# Patient Record
Sex: FEMALE | Race: WHITE | NOT HISPANIC OR LATINO | Employment: FULL TIME | URBAN - METROPOLITAN AREA
[De-identification: names, ages, dates, MRNs, and addresses within clinical notes are randomized per-mention and may not be internally consistent; named-entity substitution may affect disease eponyms.]

---

## 2023-04-10 ENCOUNTER — HOSPITAL ENCOUNTER (OUTPATIENT)
Dept: DATA CONVERSION | Facility: HOSPITAL | Age: 63
End: 2023-04-10
Attending: SPECIALIST | Admitting: SPECIALIST
Payer: COMMERCIAL

## 2023-04-10 DIAGNOSIS — S82.009A UNSPECIFIED FRACTURE OF UNSPECIFIED PATELLA, INITIAL ENCOUNTER FOR CLOSED FRACTURE: ICD-10-CM

## 2023-04-10 DIAGNOSIS — F32.A DEPRESSION, UNSPECIFIED: ICD-10-CM

## 2023-04-10 DIAGNOSIS — Z72.0 TOBACCO USE: ICD-10-CM

## 2023-04-10 DIAGNOSIS — S82.031A DISPLACED TRANSVERSE FRACTURE OF RIGHT PATELLA, INITIAL ENCOUNTER FOR CLOSED FRACTURE: ICD-10-CM

## 2023-04-10 DIAGNOSIS — E78.5 HYPERLIPIDEMIA, UNSPECIFIED: ICD-10-CM

## 2023-04-10 DIAGNOSIS — F41.9 ANXIETY DISORDER, UNSPECIFIED: ICD-10-CM

## 2023-04-10 LAB
ANION GAP IN SER/PLAS: 11 MMOL/L (ref 10–20)
ATRIAL RATE: 90 BPM
CALCIUM (MG/DL) IN SER/PLAS: 8.6 MG/DL (ref 8.6–10.3)
CARBON DIOXIDE, TOTAL (MMOL/L) IN SER/PLAS: 29 MMOL/L (ref 21–32)
CHLORIDE (MMOL/L) IN SER/PLAS: 104 MMOL/L (ref 98–107)
CREATININE (MG/DL) IN SER/PLAS: 0.52 MG/DL (ref 0.5–1.05)
ERYTHROCYTE DISTRIBUTION WIDTH (RATIO) BY AUTOMATED COUNT: 14 % (ref 11.5–14.5)
ERYTHROCYTE MEAN CORPUSCULAR HEMOGLOBIN CONCENTRATION (G/DL) BY AUTOMATED: 32.6 G/DL (ref 32–36)
ERYTHROCYTE MEAN CORPUSCULAR VOLUME (FL) BY AUTOMATED COUNT: 93 FL (ref 80–100)
ERYTHROCYTES (10*6/UL) IN BLOOD BY AUTOMATED COUNT: 3.88 X10E12/L (ref 4–5.2)
GFR FEMALE: >90 ML/MIN/1.73M2
GLUCOSE (MG/DL) IN SER/PLAS: 98 MG/DL (ref 74–99)
HEMATOCRIT (%) IN BLOOD BY AUTOMATED COUNT: 35.9 % (ref 36–46)
HEMOGLOBIN (G/DL) IN BLOOD: 11.7 G/DL (ref 12–16)
LEUKOCYTES (10*3/UL) IN BLOOD BY AUTOMATED COUNT: 9.3 X10E9/L (ref 4.4–11.3)
P AXIS: 15 DEGREES
PLATELETS (10*3/UL) IN BLOOD AUTOMATED COUNT: 313 X10E9/L (ref 150–450)
POTASSIUM (MMOL/L) IN SER/PLAS: 3.7 MMOL/L (ref 3.5–5.3)
PR INTERVAL: 140 MS
Q ONSET: 251 MS
QRS COUNT: 15 BEATS
QRS DURATION: 88 MS
QT INTERVAL: 408 MS
QTC CALCULATION(BAZETT): 500 MS
QTC FREDERICIA: 467 MS
R AXIS: -17 DEGREES
SODIUM (MMOL/L) IN SER/PLAS: 140 MMOL/L (ref 136–145)
T AXIS: 109 DEGREES
T OFFSET: 455 MS
UREA NITROGEN (MG/DL) IN SER/PLAS: 9 MG/DL (ref 6–23)
VENTRICULAR RATE: 90 BPM

## 2023-04-18 ENCOUNTER — HOSPITAL ENCOUNTER (OUTPATIENT)
Dept: DATA CONVERSION | Facility: HOSPITAL | Age: 63
End: 2023-04-18
Attending: ORTHOPAEDIC SURGERY | Admitting: ORTHOPAEDIC SURGERY
Payer: COMMERCIAL

## 2023-04-18 DIAGNOSIS — F32.A DEPRESSION, UNSPECIFIED: ICD-10-CM

## 2023-04-18 DIAGNOSIS — F41.9 ANXIETY DISORDER, UNSPECIFIED: ICD-10-CM

## 2023-04-18 DIAGNOSIS — F17.200 NICOTINE DEPENDENCE, UNSPECIFIED, UNCOMPLICATED: ICD-10-CM

## 2023-04-18 DIAGNOSIS — M26.609 UNSPECIFIED TEMPOROMANDIBULAR JOINT DISORDER, UNSPECIFIED SIDE: ICD-10-CM

## 2023-04-18 DIAGNOSIS — R32 UNSPECIFIED URINARY INCONTINENCE: ICD-10-CM

## 2023-04-18 DIAGNOSIS — R49.9 UNSPECIFIED VOICE AND RESONANCE DISORDER: ICD-10-CM

## 2023-04-18 DIAGNOSIS — E78.5 HYPERLIPIDEMIA, UNSPECIFIED: ICD-10-CM

## 2023-04-18 DIAGNOSIS — M54.50 LOW BACK PAIN, UNSPECIFIED: ICD-10-CM

## 2023-04-18 DIAGNOSIS — G89.29 OTHER CHRONIC PAIN: ICD-10-CM

## 2023-04-18 DIAGNOSIS — S52.572A OTHER INTRAARTICULAR FRACTURE OF LOWER END OF LEFT RADIUS, INITIAL ENCOUNTER FOR CLOSED FRACTURE: ICD-10-CM

## 2023-04-18 LAB
ATRIAL RATE: 93 BPM
P AXIS: 25 DEGREES
PR INTERVAL: 136 MS
Q ONSET: 249 MS
QRS COUNT: 14 BEATS
QRS DURATION: 78 MS
QT INTERVAL: 427 MS
QTC CALCULATION(BAZETT): 532 MS
QTC FREDERICIA: 494 MS
R AXIS: -41 DEGREES
T AXIS: 235 DEGREES
T OFFSET: 463 MS
VENTRICULAR RATE: 93 BPM

## 2023-05-15 ENCOUNTER — OFFICE VISIT (OUTPATIENT)
Dept: PRIMARY CARE | Facility: CLINIC | Age: 63
End: 2023-05-15
Payer: COMMERCIAL

## 2023-05-15 VITALS
HEART RATE: 102 BPM | SYSTOLIC BLOOD PRESSURE: 128 MMHG | DIASTOLIC BLOOD PRESSURE: 70 MMHG | BODY MASS INDEX: 20.3 KG/M2 | OXYGEN SATURATION: 98 % | WEIGHT: 122 LBS | TEMPERATURE: 97.3 F

## 2023-05-15 DIAGNOSIS — D64.9 ANEMIA, UNSPECIFIED TYPE: ICD-10-CM

## 2023-05-15 DIAGNOSIS — F41.1 GENERALIZED ANXIETY DISORDER: Chronic | ICD-10-CM

## 2023-05-15 DIAGNOSIS — Z13.220 SCREENING FOR HYPERLIPIDEMIA: ICD-10-CM

## 2023-05-15 DIAGNOSIS — Z13.1 SCREENING FOR DIABETES MELLITUS: ICD-10-CM

## 2023-05-15 DIAGNOSIS — F33.1 MODERATE RECURRENT MAJOR DEPRESSION (MULTI): Primary | Chronic | ICD-10-CM

## 2023-05-15 PROBLEM — M26.629 TMJ SYNDROME: Status: ACTIVE | Noted: 2023-05-15

## 2023-05-15 PROBLEM — F10.90 ALCOHOL USE: Chronic | Status: ACTIVE | Noted: 2023-05-15

## 2023-05-15 PROBLEM — Z78.9 ALCOHOL USE: Status: ACTIVE | Noted: 2023-05-15

## 2023-05-15 PROBLEM — Z78.9 ALCOHOL USE: Chronic | Status: ACTIVE | Noted: 2023-05-15

## 2023-05-15 PROBLEM — F51.04 PSYCHOPHYSIOLOGIC INSOMNIA: Status: ACTIVE | Noted: 2022-01-25

## 2023-05-15 PROBLEM — F10.90 ALCOHOL USE: Status: ACTIVE | Noted: 2023-05-15

## 2023-05-15 PROCEDURE — 1036F TOBACCO NON-USER: CPT | Performed by: FAMILY MEDICINE

## 2023-05-15 PROCEDURE — 99214 OFFICE O/P EST MOD 30 MIN: CPT | Performed by: FAMILY MEDICINE

## 2023-05-15 RX ORDER — FLUOXETINE HYDROCHLORIDE 20 MG/1
20 CAPSULE ORAL DAILY
COMMUNITY
End: 2023-05-15

## 2023-05-15 RX ORDER — FLUOXETINE HYDROCHLORIDE 40 MG/1
40 CAPSULE ORAL DAILY
Qty: 90 CAPSULE | Refills: 1 | Status: SHIPPED | OUTPATIENT
Start: 2023-05-15 | End: 2023-08-23 | Stop reason: SDUPTHER

## 2023-05-15 ASSESSMENT — ENCOUNTER SYMPTOMS
FEVER: 0
COUGH: 0
CHILLS: 0

## 2023-05-15 NOTE — PROGRESS NOTES
Subjective   Patient ID: Kate Churchill is a 62 y.o. female who presents for Anxiety, Depression, and Insomnia.    Kate presents for follow-up.  She recently had a Worker's Comp. injury and then also has broken her wrist.  Her mood has been down with the repeat injuries.  Has been taking her fluoxetine 20 mg daily, but does not feel like it is helping.  She is also trying to move.         Review of Systems   Constitutional:  Negative for chills and fever.   HENT:  Negative for congestion.    Respiratory:  Negative for cough.        Objective   /70   Pulse 102   Temp 36.3 °C (97.3 °F)   Wt 55.3 kg (122 lb)   SpO2 98%   BMI 20.30 kg/m²     Physical Exam  Constitutional:       General: She is not in acute distress.     Appearance: Normal appearance.   HENT:      Head: Normocephalic.      Mouth/Throat:      Mouth: Mucous membranes are moist.   Eyes:      Extraocular Movements: Extraocular movements intact.      Conjunctiva/sclera: Conjunctivae normal.   Cardiovascular:      Rate and Rhythm: Normal rate and regular rhythm.      Heart sounds: No murmur heard.  Pulmonary:      Breath sounds: No wheezing or rhonchi.   Musculoskeletal:      Cervical back: Neck supple.   Skin:     General: Skin is warm and dry.   Neurological:      Mental Status: She is alert.   Psychiatric:         Behavior: Behavior normal.      Comments: Tearful         Assessment/Plan   Problem List Items Addressed This Visit          Hematologic    Anemia    Relevant Orders    CBC and Auto Differential    Folate       Other    Generalized anxiety disorder (Chronic)    Relevant Medications    FLUoxetine (PROzac) 40 mg capsule    Moderate recurrent major depression (CMS/HCC) - Primary (Chronic)     Increase fluoxetine to 40mg daily. Follow-up in 3 months.          Relevant Medications    FLUoxetine (PROzac) 40 mg capsule     Other Visit Diagnoses       Screening for diabetes mellitus        Relevant Orders    Comprehensive Metabolic  Panel    Screening for hyperlipidemia        Relevant Orders    Lipid Panel

## 2023-07-12 ENCOUNTER — TELEPHONE (OUTPATIENT)
Dept: PRIMARY CARE | Facility: CLINIC | Age: 63
End: 2023-07-12
Payer: COMMERCIAL

## 2023-07-12 NOTE — TELEPHONE ENCOUNTER
Called and spoke with Chelsey- informed of provider appenage. She stated she would get patient to the ED. TAI

## 2023-07-12 NOTE — TELEPHONE ENCOUNTER
Pt's sister called Rx line @ 232 stating she wanted a call back regarding pt.     Called pt's sister to get more information, she stated that pt has finally agreed to go get treatment but they are unsure of where to go or how to go about this. Pt has been drinking a lot and is having suicidal ideation. Is this something you can help pt with? Please advise, AM

## 2023-08-14 ENCOUNTER — APPOINTMENT (OUTPATIENT)
Dept: PRIMARY CARE | Facility: CLINIC | Age: 63
End: 2023-08-14

## 2023-08-19 ENCOUNTER — LAB (OUTPATIENT)
Dept: LAB | Facility: LAB | Age: 63
End: 2023-08-19
Payer: COMMERCIAL

## 2023-08-19 DIAGNOSIS — D64.9 ANEMIA, UNSPECIFIED TYPE: ICD-10-CM

## 2023-08-19 DIAGNOSIS — Z13.220 SCREENING FOR HYPERLIPIDEMIA: ICD-10-CM

## 2023-08-19 DIAGNOSIS — Z13.1 SCREENING FOR DIABETES MELLITUS: ICD-10-CM

## 2023-08-19 LAB
ALANINE AMINOTRANSFERASE (SGPT) (U/L) IN SER/PLAS: 20 U/L (ref 7–45)
ALBUMIN (G/DL) IN SER/PLAS: 4.1 G/DL (ref 3.4–5)
ALKALINE PHOSPHATASE (U/L) IN SER/PLAS: 79 U/L (ref 33–136)
ANION GAP IN SER/PLAS: 9 MMOL/L (ref 10–20)
ASPARTATE AMINOTRANSFERASE (SGOT) (U/L) IN SER/PLAS: 16 U/L (ref 9–39)
BASOPHILS (10*3/UL) IN BLOOD BY AUTOMATED COUNT: 0.1 X10E9/L (ref 0–0.1)
BASOPHILS/100 LEUKOCYTES IN BLOOD BY AUTOMATED COUNT: 1.2 % (ref 0–2)
BILIRUBIN TOTAL (MG/DL) IN SER/PLAS: 1.4 MG/DL (ref 0–1.2)
CALCIUM (MG/DL) IN SER/PLAS: 9 MG/DL (ref 8.6–10.3)
CARBON DIOXIDE, TOTAL (MMOL/L) IN SER/PLAS: 27 MMOL/L (ref 21–32)
CHLORIDE (MMOL/L) IN SER/PLAS: 106 MMOL/L (ref 98–107)
CHOLESTEROL (MG/DL) IN SER/PLAS: 259 MG/DL (ref 0–199)
CHOLESTEROL IN HDL (MG/DL) IN SER/PLAS: 62.2 MG/DL
CHOLESTEROL/HDL RATIO: 4.2
CREATININE (MG/DL) IN SER/PLAS: 0.45 MG/DL (ref 0.5–1.05)
EOSINOPHILS (10*3/UL) IN BLOOD BY AUTOMATED COUNT: 1.15 X10E9/L (ref 0–0.7)
EOSINOPHILS/100 LEUKOCYTES IN BLOOD BY AUTOMATED COUNT: 13.7 % (ref 0–6)
ERYTHROCYTE DISTRIBUTION WIDTH (RATIO) BY AUTOMATED COUNT: 13.8 % (ref 11.5–14.5)
ERYTHROCYTE MEAN CORPUSCULAR HEMOGLOBIN CONCENTRATION (G/DL) BY AUTOMATED: 32.7 G/DL (ref 32–36)
ERYTHROCYTE MEAN CORPUSCULAR VOLUME (FL) BY AUTOMATED COUNT: 95 FL (ref 80–100)
ERYTHROCYTES (10*6/UL) IN BLOOD BY AUTOMATED COUNT: 4.69 X10E12/L (ref 4–5.2)
FOLATE (NG/ML) IN SER/PLAS: >22.3 NG/ML
GFR FEMALE: >90 ML/MIN/1.73M2
GLUCOSE (MG/DL) IN SER/PLAS: 87 MG/DL (ref 74–99)
HEMATOCRIT (%) IN BLOOD BY AUTOMATED COUNT: 44.7 % (ref 36–46)
HEMOGLOBIN (G/DL) IN BLOOD: 14.6 G/DL (ref 12–16)
IMMATURE GRANULOCYTES/100 LEUKOCYTES IN BLOOD BY AUTOMATED COUNT: 0.4 % (ref 0–0.9)
LDL: 176 MG/DL (ref 0–99)
LEUKOCYTES (10*3/UL) IN BLOOD BY AUTOMATED COUNT: 8.4 X10E9/L (ref 4.4–11.3)
LYMPHOCYTES (10*3/UL) IN BLOOD BY AUTOMATED COUNT: 3.05 X10E9/L (ref 1.2–4.8)
LYMPHOCYTES/100 LEUKOCYTES IN BLOOD BY AUTOMATED COUNT: 36.3 % (ref 13–44)
MONOCYTES (10*3/UL) IN BLOOD BY AUTOMATED COUNT: 0.84 X10E9/L (ref 0.1–1)
MONOCYTES/100 LEUKOCYTES IN BLOOD BY AUTOMATED COUNT: 10 % (ref 2–10)
NEUTROPHILS (10*3/UL) IN BLOOD BY AUTOMATED COUNT: 3.24 X10E9/L (ref 1.2–7.7)
NEUTROPHILS/100 LEUKOCYTES IN BLOOD BY AUTOMATED COUNT: 38.4 % (ref 40–80)
PLATELETS (10*3/UL) IN BLOOD AUTOMATED COUNT: 295 X10E9/L (ref 150–450)
POTASSIUM (MMOL/L) IN SER/PLAS: 4.3 MMOL/L (ref 3.5–5.3)
PROTEIN TOTAL: 6.6 G/DL (ref 6.4–8.2)
SODIUM (MMOL/L) IN SER/PLAS: 138 MMOL/L (ref 136–145)
TRIGLYCERIDE (MG/DL) IN SER/PLAS: 105 MG/DL (ref 0–149)
UREA NITROGEN (MG/DL) IN SER/PLAS: 9 MG/DL (ref 6–23)
VLDL: 21 MG/DL (ref 0–40)

## 2023-08-19 PROCEDURE — 85025 COMPLETE CBC W/AUTO DIFF WBC: CPT

## 2023-08-19 PROCEDURE — 80053 COMPREHEN METABOLIC PANEL: CPT

## 2023-08-19 PROCEDURE — 36415 COLL VENOUS BLD VENIPUNCTURE: CPT

## 2023-08-19 PROCEDURE — 82746 ASSAY OF FOLIC ACID SERUM: CPT

## 2023-08-19 PROCEDURE — 80061 LIPID PANEL: CPT

## 2023-08-23 ENCOUNTER — OFFICE VISIT (OUTPATIENT)
Dept: PRIMARY CARE | Facility: CLINIC | Age: 63
End: 2023-08-23
Payer: COMMERCIAL

## 2023-08-23 VITALS
WEIGHT: 128 LBS | DIASTOLIC BLOOD PRESSURE: 74 MMHG | SYSTOLIC BLOOD PRESSURE: 130 MMHG | HEART RATE: 86 BPM | OXYGEN SATURATION: 98 % | BODY MASS INDEX: 22.67 KG/M2 | TEMPERATURE: 97.7 F

## 2023-08-23 DIAGNOSIS — F33.1 MODERATE RECURRENT MAJOR DEPRESSION (MULTI): Primary | Chronic | ICD-10-CM

## 2023-08-23 DIAGNOSIS — R22.31 LOCALIZED SWELLING, MASS, OR LUMP OF RIGHT UPPER EXTREMITY: ICD-10-CM

## 2023-08-23 DIAGNOSIS — Z13.6 SCREENING FOR HEART DISEASE: ICD-10-CM

## 2023-08-23 DIAGNOSIS — F41.1 GENERALIZED ANXIETY DISORDER: Chronic | ICD-10-CM

## 2023-08-23 DIAGNOSIS — F51.01 PRIMARY INSOMNIA: ICD-10-CM

## 2023-08-23 DIAGNOSIS — Z78.9 ALCOHOL USE: Chronic | ICD-10-CM

## 2023-08-23 DIAGNOSIS — E78.00 PURE HYPERCHOLESTEROLEMIA: Chronic | ICD-10-CM

## 2023-08-23 PROCEDURE — 99214 OFFICE O/P EST MOD 30 MIN: CPT | Performed by: FAMILY MEDICINE

## 2023-08-23 PROCEDURE — 1036F TOBACCO NON-USER: CPT | Performed by: FAMILY MEDICINE

## 2023-08-23 RX ORDER — FLUOXETINE HYDROCHLORIDE 40 MG/1
40 CAPSULE ORAL DAILY
Qty: 90 CAPSULE | Refills: 1 | Status: SHIPPED | OUTPATIENT
Start: 2023-08-23 | End: 2024-02-21 | Stop reason: SDUPTHER

## 2023-08-23 ASSESSMENT — ENCOUNTER SYMPTOMS
ABDOMINAL PAIN: 0
COUGH: 0
DYSURIA: 0
DIARRHEA: 0
FEVER: 0
SHORTNESS OF BREATH: 0
NAUSEA: 0
CHILLS: 0
VOMITING: 0

## 2023-08-23 NOTE — PATIENT INSTRUCTIONS
[facility]  [phone]  Monday through Friday  8 a.m. - 4:30 p.m.    Check out [employer] to find a counselor.     Try over-the-counter melatonin to help with sleep.     Main AA phone number [phone]    [email or web address]

## 2023-08-23 NOTE — PROGRESS NOTES
Subjective   Patient ID: Kate Churchill is a 62 y.o. female who presents for Depression (Recheck ).    Kate presents for follow-up. Since last visit was in ER for alcohol intoxication and suicidal ideation. Was treated with lorazepam then discharged. Patient did not follow-up with addiction medicine. Feels she is doing better since decreased alcohol intake. Previously was drinking 12 beers per day. Now down to 5-6 per day. Mood has improved. She denies suicidal thoughts at this time. Feels the fluoxetine is working well for her mood. Is having difficulty sleeping.     Also has noticed new swelling on right forearm. Not growing in size. Not painful unless squeezed.          Review of Systems   Constitutional:  Negative for chills and fever.   Respiratory:  Negative for cough and shortness of breath.    Cardiovascular:  Negative for chest pain.   Gastrointestinal:  Negative for abdominal pain, diarrhea, nausea and vomiting.   Genitourinary:  Negative for dysuria.       Objective   /74   Pulse 86   Temp 36.5 °C (97.7 °F)   Wt 58.1 kg (128 lb)   SpO2 98%   BMI 22.67 kg/m²     Physical Exam  Constitutional:       General: She is not in acute distress.     Appearance: Normal appearance.   HENT:      Head: Normocephalic.      Mouth/Throat:      Mouth: Mucous membranes are moist.   Eyes:      Extraocular Movements: Extraocular movements intact.      Conjunctiva/sclera: Conjunctivae normal.   Cardiovascular:      Rate and Rhythm: Normal rate and regular rhythm.      Heart sounds: No murmur heard.  Pulmonary:      Breath sounds: No wheezing or rhonchi.   Musculoskeletal:      Cervical back: Neck supple.   Skin:     General: Skin is warm and dry.   Neurological:      Mental Status: She is alert.   Psychiatric:         Attention and Perception: Attention normal.         Mood and Affect: Mood is depressed.         Speech: Speech normal.         Behavior: Behavior normal. Behavior is cooperative.          Thought Content: Thought content is not paranoid. Thought content does not include suicidal ideation. Thought content does not include suicidal plan.         Cognition and Memory: Cognition normal.         Assessment/Plan   Problem List Items Addressed This Visit       Generalized anxiety disorder (Chronic)    Relevant Medications    FLUoxetine (PROzac) 40 mg capsule    Moderate recurrent major depression (CMS/HCC) - Primary (Chronic)     Stable. Continue fluoxetine.          Relevant Medications    FLUoxetine (PROzac) 40 mg capsule    Alcohol use (Chronic)     Discussed naltrexone; patient declines at this time. She declines addiction medicine referral. Refer to AA.          Pure hypercholesterolemia (Chronic)    Relevant Orders    CT cardiac scoring wo IV contrast    Primary insomnia     Recommend try melatonin.           Other Visit Diagnoses       Localized swelling, mass, or lump of right upper extremity        Relevant Orders    US nonvascular extremity US extremity nonvascular real time w image documentation complete    Screening for heart disease        Relevant Orders    CT cardiac scoring wo IV contrast

## 2023-08-23 NOTE — ASSESSMENT & PLAN NOTE
Discussed naltrexone; patient declines at this time. She declines addiction medicine referral. Refer to AA.

## 2023-08-24 ENCOUNTER — TELEPHONE (OUTPATIENT)
Dept: PRIMARY CARE | Facility: CLINIC | Age: 63
End: 2023-08-24
Payer: COMMERCIAL

## 2023-08-24 DIAGNOSIS — R17 ELEVATED BILIRUBIN: Primary | ICD-10-CM

## 2023-08-24 NOTE — TELEPHONE ENCOUNTER
Nimo from  Lab called back stating that they were unable to add on lab due to the sample being discarded already. How would you like to proceed? Thanks, AM

## 2023-09-07 VITALS — HEIGHT: 65 IN | BODY MASS INDEX: 20.57 KG/M2 | WEIGHT: 123.46 LBS

## 2023-09-07 VITALS — BODY MASS INDEX: 21.83 KG/M2 | WEIGHT: 127.87 LBS | HEIGHT: 64 IN

## 2023-09-14 NOTE — H&P
History & Physical Reviewed:   I have reviewed the History and Physical dated:  06-Apr-2023   History and Physical reviewed and relevant findings noted. Patient examined to review pertinent physical  findings.: No significant changes   Home Medications Reviewed: no changes noted   Allergies Reviewed: no changes noted       ERAS (Enhanced Recovery After Surgery):  ·  ERAS Patient: no     Consent:   COVID-19 Consent:  ·  COVID-19 Risk Consent Surgeon has reviewed key risks related to the risk of prasad COVID-19 and if they contract COVID-19 what the risks are.       Electronic Signatures:  Wilver Lott)  (Signed 10-Apr-2023 06:29)   Authored: History & Physical Reviewed, ERAS, Consent,  Note Completion      Last Updated: 10-Apr-2023 06:29 by Wilver Lott)

## 2023-09-14 NOTE — H&P
History & Physical Reviewed:   I have reviewed the History and Physical dated:  14-Apr-2023   History and Physical reviewed and relevant findings noted. Patient examined to review pertinent physical  findings.: No significant changes   Home Medications Reviewed: no changes noted   Allergies Reviewed: no changes noted       ERAS (Enhanced Recovery After Surgery):  ·  ERAS Patient: no     Consent:   COVID-19 Consent:  ·  COVID-19 Risk Consent Surgeon has reviewed key risks related to the risk of prasad COVID-19 and if they contract COVID-19 what the risks are.       Electronic Signatures:  CHRIS Andersen James ()  (Signed 18-Apr-2023 12:27)   Authored: History & Physical Reviewed, ERAS, Consent,  Note Completion      Last Updated: 18-Apr-2023 12:27 by CHRIS Andersen James ()

## 2023-09-14 NOTE — PROGRESS NOTES
Service: Orthopaedics     Subjective Data:   DEWEY DWYER is a 62 year old Female who is Hospital Day # 1.    Objective Data:     Objective Information:    ORTHOPEDIC OPERATIVE NOTE    Name: Dewey Dwyer  : [birth year]  Surgeon: Bassem Andersen DO  Facility: [facility]  Date of Surgery: 23     SURGEON:     Bassem Andersen DO  ASSISTANT:  SA Chacon  PREOPERATIVE DIAGNOSIS: Closed, 3 part intra-articular fracture, left distal radius  POSTOPERATIVE DIAGNOSIS: Closed, 3 part intra-articular fracture, left distal radius  PROCEDURE:   Open reduction internal fixation with volar plate.  ANESTHESIA:    MAC and Block  BLOOD LOSS: Minimal    PROCEDURE: The patient was seen and consented preoperatively with the side and site of surgery appropriately marked. The patient was taken back to operative suite, placed supine on the operative table, and placed on monitor for the duration of the case.  The patient was administered sedation and monitored throughout the entire surgery by Department of Anesthesia. The patient had recieved a block pre-operatively by the department of anesthesia.  While sedated, the left upper extremity was sterilely prepped  and draped in the sterile orthopedic fashion, elevated with an Esmarch, tourniquet inflated to 250 mmHg for duration of case. A time-out was performed confirming the site of surgery and surgery to be performed.    A longitudinal incision was made over the volar radial aspect of the wrist. Dissection was taken through the skin and subcutaneous tissue using electrocautery as necessary. The flexor carpi radialis tendon was identified and its sheath was released as  far as possible. The branch of the radial artery was protected.    An incision was made through the forearm fascia adjacent to the flexor carpi radialis along the radial aspect. Blunt dissection was taken through the muscles until the pronator quadratus was visualized. It was released sharply off the  radius and again,  hemostasis with electrocautery was performed as necessary.    The insertion of the brachioradialis was released to neutralize the displacement force on the distal radial fragment. Care was taken to avoid injury to any of the 1st compartment extensor tendons.    The 3 part intra-articular fracture was identified. The shaft of the radius was gently retracted away from the distal fragment to clear it of fracture hematoma.    Fracture reduction was then performed manually under direct visualization until satisfactory alignment was identified. This was supplemented with K-wires as necessary.    The plate was applied to the volar aspect. C-arm was then used to assure proper alignments. Adjustments were made as necessary. First screw was placed in the oblong hole. The distal screws were then inserted using the guides in the usual manner.    The plate was then reduced along the shaft, further reducing the fracture and reproducing the volar tilt. The shaft of the plate was secured with cortical screws in the usual manner. Once again, the procedure was performed with fluoroscopy to ensure satisfactory  reduction. The pronator quadratus was placed back in its normal position.    At the completion of the procedure, the wrist was placed through range of motion and noted to be essentially normal with good stability of the fracture.  DRUJ was stressed and found to be stable.     The tourniquet was then released. The wound was thoroughly irrigated with antibiotic solution. Hemostasis was acquired with pressure and electrocautery as necessary. The subcutaneous tissue was closed with 4-0 vicryl and the skin was closed with running  5-0 nylon. A soft bulky dressing was applied along with a volar splint. The patient was taken to the recovery room in satisfactory condition.     Electronically signed  Bassem Andersen DO     Assessment and Plan:   Code Status:  ·  Code Status Full Code       Electronic Signatures:  Ganesh  Kit PEREZ ()  (Signed 18-Apr-2023 14:22)   Authored: Service, Subjective Data, Objective Data, Assessment  and Plan, Note Completion      Last Updated: 18-Apr-2023 14:22 by CHRIS Andersen James ()

## 2023-10-02 NOTE — OP NOTE
PROCEDURE DETAILS    Preoperative Diagnosis:  Right transverse patella fracture  Postoperative Diagnosis:  Right transverse patella fracture, comminuted  Surgeon: Wilver Lott  Resident/Fellow/Other Assistant: Mario Tse    Procedure:  1.  OPEN REDUCTION INTERNAL FIXATION OF RIGHT PATELLA FRACTURE (C-ARM)    Anesthesia: General plus block  Estimated Blood Loss: 5  Findings: Comminuted fracture articular pieces came back anatomically reduced  Implants: 6 2 K wires times two, #5 Tycron x3        Operative Report:   Preoperatively patient had a femoral nerve block and then was brought back to the OR and placed supine on the OR table.  She was given a preoperative IV antibiotic.  The right lower  extremity was then sterilely prepped and draped below a thigh tourniquet.  At the start of the case the extremity was exsanguinated and the thigh tourniquet elevated to 300 mmHg.  At the start of the case we used a 10 blade and made a longitudinal incision  over the anterior aspect of the knee approximately 10 to 12 cm in length.  After sharp dissection of the dermis careful blunt and sharp dissection brought us down to the retinaculum around the patella that was split medial and lateral exposing the joint  through the fractured fragments.  The distal pole slightly smaller noted some more dorsally based comminuted pieces.  The intra-articular portion of the fracture was mostly intact.  We then copiously irrigated the knee joint to remove any clot.  We used  a rongeur to clean the 2 fracture edges.  Using standard C arm guidance throughout we then placed 2.06 tube guidewires from the inferior pole to the superior pole maintaining anatomic reduction of the intra-articular portion of the fracture.  Once we  noted reduction with the 2 longitudinal K wires we then used #5 Tycron did a cerclage in 2 figure-of-eight's in the usual tension band manner and this brought all the comminuted pieces together.  We then flexed  the knee with the construct intact and noted  compression with flexion.  We therefore cut and bent the proximal portion of the K wires and then pulled them into the proximal pole of the fracture securing the underlying tension band Tycron stitch.  We slightly bent the distal portion of the K wires  and cut them flush underneath the patellar tendon.  Final C arm image showed the reduction of the fracture and stability under live fluoroscopy.  Therefore sepideh irrigated out the surgical site in the joint.  We closed the medial lateral retinacular splits  with a running locking 0 Vicryl suture.  The subdermis was brought together interrupted buried 2-0 Vicryl and the skin with surgical staples.  Patient has sterile dressing applied and then a hinged knee brace was placed 0 to 30 degrees of flexion during  was let down tourniquet time was under an hour there is good perfusion of the foot at the end of the case patient woke in stable condition transferred to recovery                        Attestation:   Note Completion:  Attending Attestation I performed the procedure without a resident         Electronic Signatures:  Wilver Lott)  (Signed 10-Apr-2023 13:12)   Authored: Post-Operative Note, Chart Review, Note Completion      Last Updated: 10-Apr-2023 13:12 by Wilver Lott)

## 2023-11-16 DIAGNOSIS — S52.572A CLOSED DIE PUNCH FRACTURE OF DISTAL RADIUS, LEFT, INITIAL ENCOUNTER: ICD-10-CM

## 2023-11-21 ENCOUNTER — APPOINTMENT (OUTPATIENT)
Dept: ORTHOPEDIC SURGERY | Facility: CLINIC | Age: 63
End: 2023-11-21
Payer: COMMERCIAL

## 2023-11-22 ENCOUNTER — APPOINTMENT (OUTPATIENT)
Dept: RADIOLOGY | Facility: CLINIC | Age: 63
End: 2023-11-22
Payer: COMMERCIAL

## 2023-11-22 ENCOUNTER — APPOINTMENT (OUTPATIENT)
Dept: ORTHOPEDIC SURGERY | Facility: CLINIC | Age: 63
End: 2023-11-22
Payer: COMMERCIAL

## 2024-02-05 ENCOUNTER — TELEPHONE (OUTPATIENT)
Dept: PRIMARY CARE | Facility: CLINIC | Age: 64
End: 2024-02-05
Payer: COMMERCIAL

## 2024-02-05 NOTE — TELEPHONE ENCOUNTER
Pt called rx line at 1123a stating she has had a recurrent UTI and was treated with Bactrim by someone else and wants another antibiotic.    Please call pt and setup an appt with another provider since BMJ is out.  Ok to double book with PARVEEN

## 2024-02-06 ENCOUNTER — OFFICE VISIT (OUTPATIENT)
Dept: PRIMARY CARE | Facility: CLINIC | Age: 64
End: 2024-02-06
Payer: COMMERCIAL

## 2024-02-06 VITALS
BODY MASS INDEX: 23.74 KG/M2 | TEMPERATURE: 97.7 F | HEART RATE: 68 BPM | DIASTOLIC BLOOD PRESSURE: 84 MMHG | WEIGHT: 134 LBS | SYSTOLIC BLOOD PRESSURE: 126 MMHG

## 2024-02-06 DIAGNOSIS — R35.0 URINARY FREQUENCY: ICD-10-CM

## 2024-02-06 LAB
POC APPEARANCE, URINE: CLEAR
POC BILIRUBIN, URINE: NEGATIVE
POC BLOOD, URINE: ABNORMAL
POC COLOR, URINE: YELLOW
POC GLUCOSE, URINE: NEGATIVE MG/DL
POC KETONES, URINE: NEGATIVE MG/DL
POC LEUKOCYTES, URINE: ABNORMAL
POC NITRITE,URINE: NEGATIVE
POC PH, URINE: 6 PH
POC PROTEIN, URINE: NEGATIVE MG/DL
POC SPECIFIC GRAVITY, URINE: 1.02
POC UROBILINOGEN, URINE: 0.2 EU/DL

## 2024-02-06 PROCEDURE — 99213 OFFICE O/P EST LOW 20 MIN: CPT | Performed by: FAMILY MEDICINE

## 2024-02-06 PROCEDURE — 87086 URINE CULTURE/COLONY COUNT: CPT

## 2024-02-06 PROCEDURE — 1036F TOBACCO NON-USER: CPT | Performed by: FAMILY MEDICINE

## 2024-02-06 PROCEDURE — 81003 URINALYSIS AUTO W/O SCOPE: CPT | Performed by: FAMILY MEDICINE

## 2024-02-06 RX ORDER — NALTREXONE HYDROCHLORIDE 50 MG/1
TABLET, FILM COATED ORAL
COMMUNITY
Start: 2024-01-12 | End: 2024-02-21 | Stop reason: SDUPTHER

## 2024-02-06 RX ORDER — SULFAMETHOXAZOLE AND TRIMETHOPRIM 800; 160 MG/1; MG/1
1 TABLET ORAL 2 TIMES DAILY
Qty: 14 TABLET | Refills: 0 | Status: SHIPPED | OUTPATIENT
Start: 2024-02-06 | End: 2024-02-12 | Stop reason: ALTCHOICE

## 2024-02-06 RX ORDER — TRAZODONE HYDROCHLORIDE 50 MG/1
TABLET ORAL
COMMUNITY
Start: 2024-01-19 | End: 2024-05-22 | Stop reason: SDUPTHER

## 2024-02-06 ASSESSMENT — ENCOUNTER SYMPTOMS: FREQUENCY: 1

## 2024-02-06 NOTE — PROGRESS NOTES
Subjective   Patient ID: Kate Churchill is a 63 y.o. female who presents for Urinary Frequency (Unable to fully empty bladder, confusion x 1 month).  Urinary Frequency   Associated symptoms include frequency.     Pt presents with urinary frequency x 1 month.  Feels like she can't empty completely.  Going through ETOH detox.  No fever or chills.  Some back pain at times but she has some chronically.    Patient Active Problem List   Diagnosis    Generalized anxiety disorder    Moderate recurrent major depression (CMS/HCC)    Anemia    Alcohol use    Pure hypercholesterolemia    Primary insomnia       Social Connections: Not on file       Current Outpatient Medications on File Prior to Visit   Medication Sig Dispense Refill    FLUoxetine (PROzac) 40 mg capsule Take 1 capsule (40 mg) by mouth once daily. 90 capsule 1    naltrexone (Depade) 50 mg tablet       ondansetron ODT (Zofran-ODT) 4 mg disintegrating tablet DISSOLVE 1 TABLET IN MOUTH THREE TIMES A DAY (Patient not taking: Reported on 2/6/2024) 15 tablet 0    traZODone (Desyrel) 50 mg tablet        No current facility-administered medications on file prior to visit.        Vitals:    02/06/24 1403   BP: 126/84   Pulse: 68   Temp: 36.5 °C (97.7 °F)     Vitals:    02/06/24 1403   Weight: 60.8 kg (134 lb)       Review of Systems   Genitourinary:  Positive for frequency.   All other systems reviewed and are negative.      Objective     Physical Exam  Constitutional:       Appearance: Normal appearance. She is well-developed.   HENT:      Head: Atraumatic.   Cardiovascular:      Rate and Rhythm: Normal rate and regular rhythm.      Heart sounds: Normal heart sounds. No murmur heard.  Pulmonary:      Effort: Pulmonary effort is normal.      Breath sounds: Normal breath sounds.   Abdominal:      General: Bowel sounds are normal.      Palpations: Abdomen is soft.   Skin:     General: Skin is warm.   Neurological:      General: No focal deficit present.      Mental  Status: She is alert.   Psychiatric:         Mood and Affect: Mood normal.         No visits with results within 2 Month(s) from this visit.   Latest known visit with results is:   Lab on 08/19/2023   Component Date Value Ref Range Status    Cholesterol 08/19/2023 259 (H)  0 - 199 mg/dL Final    .      AGE      DESIRABLE   BORDERLINE HIGH   HIGH     0-19 Y     0 - 169       170 - 199     >/= 200    20-24 Y     0 - 189       190 - 224     >/= 225         >24 Y     0 - 199       200 - 239     >/= 240   **All ranges are based on fasting samples. Specific   therapeutic targets will vary based on patient-specific   cardiac risk.  .   Pediatric guidelines reference:Pediatrics 2011, 128(S5).   Adult guidelines reference: NCEP ATPIII Guidelines,     LEVAR 2001, 258:2486-97  .   Venipuncture immediately after or during the    administration of Metamizole may lead to falsely   low results. Testing should be performed immediately   prior to Metamizole dosing.    HDL 08/19/2023 62.2  mg/dL Final    .      AGE      VERY LOW   LOW     NORMAL    HIGH       0-19 Y       < 35   < 40     40-45     ----    20-24 Y       ----   < 40       >45     ----      >24 Y       ----   < 40     40-60      >60  .    Cholesterol/HDL Ratio 08/19/2023 4.2   Final    REF VALUES  DESIRABLE  < 3.4  HIGH RISK  > 5.0    LDL 08/19/2023 176 (H)  0 - 99 mg/dL Final    .                           NEAR      BORD      AGE      DESIRABLE  OPTIMAL    HIGH     HIGH     VERY HIGH     0-19 Y     0 - 109     ---    110-129   >/= 130     ----    20-24 Y     0 - 119     ---    120-159   >/= 160     ----      >24 Y     0 -  99   100-129  130-159   160-189     >/=190  .    VLDL 08/19/2023 21  0 - 40 mg/dL Final    Triglycerides 08/19/2023 105  0 - 149 mg/dL Final    .      AGE      DESIRABLE   BORDERLINE HIGH   HIGH     VERY HIGH   0 D-90 D    19 - 174         ----         ----        ----  91 D- 9 Y     0 -  74        75 -  99     >/= 100      ----    10-19 Y     0 -  89         90 - 129     >/= 130      ----    20-24 Y     0 - 114       115 - 149     >/= 150      ----         >24 Y     0 - 149       150 - 199    200- 499    >/= 500  .   Venipuncture immediately after or during the    administration of Metamizole may lead to falsely   low results. Testing should be performed immediately   prior to Metamizole dosing.    Glucose 08/19/2023 87  74 - 99 mg/dL Final    Sodium 08/19/2023 138  136 - 145 mmol/L Final    Potassium 08/19/2023 4.3  3.5 - 5.3 mmol/L Final    Chloride 08/19/2023 106  98 - 107 mmol/L Final    Bicarbonate 08/19/2023 27  21 - 32 mmol/L Final    Anion Gap 08/19/2023 9 (L)  10 - 20 mmol/L Final    Urea Nitrogen 08/19/2023 9  6 - 23 mg/dL Final    Creatinine 08/19/2023 0.45 (L)  0.50 - 1.05 mg/dL Final    GFR Female 08/19/2023 >90  >90 mL/min/1.73m2 Final     CALCULATIONS OF ESTIMATED GFR ARE PERFORMED   USING THE 2021 CKD-EPI STUDY REFIT EQUATION   WITHOUT THE RACE VARIABLE FOR THE IDMS-TRACEABLE   CREATININE METHODS.    https://jasn.asnjournals.org/content/early/2021/09/22/ASN.6801789915    Calcium 08/19/2023 9.0  8.6 - 10.3 mg/dL Final    Albumin 08/19/2023 4.1  3.4 - 5.0 g/dL Final    Alkaline Phosphatase 08/19/2023 79  33 - 136 U/L Final    Total Protein 08/19/2023 6.6  6.4 - 8.2 g/dL Final    AST 08/19/2023 16  9 - 39 U/L Final    Total Bilirubin 08/19/2023 1.4 (H)  0.0 - 1.2 mg/dL Final    ALT (SGPT) 08/19/2023 20  7 - 45 U/L Final     Patients treated with Sulfasalazine may generate    falsely decreased results for ALT.    WBC 08/19/2023 8.4  4.4 - 11.3 x10E9/L Final    RBC 08/19/2023 4.69  4.00 - 5.20 x10E12/L Final    Hemoglobin 08/19/2023 14.6  12.0 - 16.0 g/dL Final    Hematocrit 08/19/2023 44.7  36.0 - 46.0 % Final    MCV 08/19/2023 95  80 - 100 fL Final    MCHC 08/19/2023 32.7  32.0 - 36.0 g/dL Final    Platelets 08/19/2023 295  150 - 450 x10E9/L Final    RDW 08/19/2023 13.8  11.5 - 14.5 % Final    Neutrophils % 08/19/2023 38.4  40.0 - 80.0 % Final     Immature Granulocytes %, Automated 08/19/2023 0.4  0.0 - 0.9 % Final     Immature Granulocyte Count (IG) includes promyelocytes,    myelocytes and metamyelocytes but does not include bands.   Percent differential counts (%) should be interpreted in the   context of the absolute cell counts (cells/L).    Lymphocytes % 08/19/2023 36.3  13.0 - 44.0 % Final    Monocytes % 08/19/2023 10.0  2.0 - 10.0 % Final    Eosinophils % 08/19/2023 13.7  0.0 - 6.0 % Final    Basophils % 08/19/2023 1.2  0.0 - 2.0 % Final    Neutrophils Absolute 08/19/2023 3.24  1.20 - 7.70 x10E9/L Final    Lymphocytes Absolute 08/19/2023 3.05  1.20 - 4.80 x10E9/L Final    Monocytes Absolute 08/19/2023 0.84  0.10 - 1.00 x10E9/L Final    Eosinophils Absolute 08/19/2023 1.15 (H)  0.00 - 0.70 x10E9/L Final    Basophils Absolute 08/19/2023 0.10  0.00 - 0.10 x10E9/L Final    Folate 08/19/2023 >22.3  >5.0 ng/mL Final    Low           <3.4  Borderline 3.4-5.0  Normal        >5.0  .   Patients receiving more than 5 mg/day of biotin may have interference   in test results. A sample should be taken no sooner than eight hours   after previous dose. Contact the testing laboratory for additional   information.        Assessment/Plan   Problem List Items Addressed This Visit    None  Visit Diagnoses         Codes    Urinary frequency     R35.0    Relevant Medications    sulfamethoxazole-trimethoprim (Bactrim DS) 800-160 mg tablet    Other Relevant Orders    POCT UA Automated manually resulted (Completed)          Treating for presumptive infection.  Bactrim x 7 days.  Call if not resolving.

## 2024-02-08 ENCOUNTER — TELEPHONE (OUTPATIENT)
Dept: PRIMARY CARE | Facility: CLINIC | Age: 64
End: 2024-02-08
Payer: COMMERCIAL

## 2024-02-08 LAB — BACTERIA UR CULT: NORMAL

## 2024-02-08 NOTE — TELEPHONE ENCOUNTER
Called pt and informed of results, pt states she feels better.    She wants to know if she can still continue antibiotic? Please advise Thx

## 2024-02-08 NOTE — TELEPHONE ENCOUNTER
----- Message from Sharri Booker MA sent at 2/8/2024  2:26 PM EST -----    ----- Message -----  From: Kem Vinson MD  Sent: 2/8/2024   2:14 PM EST  To: Sharri Booker MA    Pts urine didn't grow anything.  Are her symptoms better?

## 2024-02-12 ENCOUNTER — TELEPHONE (OUTPATIENT)
Dept: PRIMARY CARE | Facility: CLINIC | Age: 64
End: 2024-02-12
Payer: COMMERCIAL

## 2024-02-12 DIAGNOSIS — R35.0 URINARY FREQUENCY: Primary | ICD-10-CM

## 2024-02-12 DIAGNOSIS — R31.29 OTHER MICROSCOPIC HEMATURIA: ICD-10-CM

## 2024-02-12 NOTE — PROGRESS NOTES
Subjective   Patient ID: Kate Churchill is a 63 y.o. female who presents for No chief complaint on file..  HPI    Patient Active Problem List   Diagnosis    Generalized anxiety disorder    Moderate recurrent major depression (CMS/HCC)    Anemia    Alcohol use    Pure hypercholesterolemia    Primary insomnia       Social Connections: Not on file       Current Outpatient Medications on File Prior to Visit   Medication Sig Dispense Refill    FLUoxetine (PROzac) 40 mg capsule Take 1 capsule (40 mg) by mouth once daily. 90 capsule 1    naltrexone (Depade) 50 mg tablet       sulfamethoxazole-trimethoprim (Bactrim DS) 800-160 mg tablet Take 1 tablet by mouth 2 times a day for 7 days. 14 tablet 0    traZODone (Desyrel) 50 mg tablet       [DISCONTINUED] ondansetron ODT (Zofran-ODT) 4 mg disintegrating tablet DISSOLVE 1 TABLET IN MOUTH THREE TIMES A DAY (Patient not taking: Reported on 2/6/2024) 15 tablet 0     No current facility-administered medications on file prior to visit.        There were no vitals filed for this visit.  There were no vitals filed for this visit.    Review of Systems    Objective     Physical Exam    Office Visit on 02/06/2024   Component Date Value Ref Range Status    POC Color, Urine 02/06/2024 Yellow  Straw, Yellow, Light-Yellow Final    POC Appearance, Urine 02/06/2024 Clear  Clear Final    POC Glucose, Urine 02/06/2024 NEGATIVE  NEGATIVE mg/dl Final    POC Bilirubin, Urine 02/06/2024 NEGATIVE  NEGATIVE Final    POC Ketones, Urine 02/06/2024 NEGATIVE  NEGATIVE mg/dl Final    POC Specific Gravity, Urine 02/06/2024 1.020  1.005 - 1.035 Final    POC Blood, Urine 02/06/2024 MODERATE (2+) (A)  NEGATIVE Final    POC PH, Urine 02/06/2024 6.0  No Reference Range Established PH Final    POC Protein, Urine 02/06/2024 NEGATIVE  NEGATIVE, 30 (1+) mg/dl Final    POC Urobilinogen, Urine 02/06/2024 0.2  0.2, 1.0 EU/DL Final    Poc Nitrite, Urine 02/06/2024 NEGATIVE  NEGATIVE Final    POC Leukocytes, Urine  02/06/2024 SMALL (1+) (A)  NEGATIVE Final    Urine Culture 02/06/2024 No significant growth   Final       Assessment/Plan

## 2024-02-12 NOTE — TELEPHONE ENCOUNTER
Pt saw [facility] on 2/6, see other msg 2/9, but today on rx line at 139p pt states she is still having urgency and pain.    Please advise on how to proceed? Thx

## 2024-02-21 ENCOUNTER — OFFICE VISIT (OUTPATIENT)
Dept: PRIMARY CARE | Facility: CLINIC | Age: 64
End: 2024-02-21
Payer: COMMERCIAL

## 2024-02-21 VITALS
BODY MASS INDEX: 23.74 KG/M2 | WEIGHT: 134 LBS | DIASTOLIC BLOOD PRESSURE: 62 MMHG | SYSTOLIC BLOOD PRESSURE: 118 MMHG | HEART RATE: 85 BPM | OXYGEN SATURATION: 99 %

## 2024-02-21 DIAGNOSIS — R35.0 URINARY FREQUENCY: ICD-10-CM

## 2024-02-21 DIAGNOSIS — F33.1 MODERATE RECURRENT MAJOR DEPRESSION (MULTI): Chronic | ICD-10-CM

## 2024-02-21 DIAGNOSIS — F10.21 ALCOHOL DEPENDENCE IN REMISSION (MULTI): ICD-10-CM

## 2024-02-21 DIAGNOSIS — M46.1 BILATERAL SACROILIITIS (CMS-HCC): ICD-10-CM

## 2024-02-21 DIAGNOSIS — E78.00 PURE HYPERCHOLESTEROLEMIA: Chronic | ICD-10-CM

## 2024-02-21 DIAGNOSIS — F41.1 GENERALIZED ANXIETY DISORDER: Chronic | ICD-10-CM

## 2024-02-21 DIAGNOSIS — Z00.00 WELLNESS EXAMINATION: Primary | ICD-10-CM

## 2024-02-21 DIAGNOSIS — F51.01 PRIMARY INSOMNIA: ICD-10-CM

## 2024-02-21 PROBLEM — F10.220 ALCOHOL DEPENDENCE WITH UNCOMPLICATED INTOXICATION (MULTI): Status: ACTIVE | Noted: 2024-02-21

## 2024-02-21 LAB
POC APPEARANCE, URINE: CLEAR
POC BILIRUBIN, URINE: NEGATIVE
POC BLOOD, URINE: NEGATIVE
POC COLOR, URINE: YELLOW
POC GLUCOSE, URINE: NEGATIVE MG/DL
POC KETONES, URINE: NEGATIVE MG/DL
POC LEUKOCYTES, URINE: ABNORMAL
POC NITRITE,URINE: NEGATIVE
POC PH, URINE: 6.5 PH
POC PROTEIN, URINE: NEGATIVE MG/DL
POC SPECIFIC GRAVITY, URINE: 1.01
POC UROBILINOGEN, URINE: 0.2 EU/DL

## 2024-02-21 PROCEDURE — 87086 URINE CULTURE/COLONY COUNT: CPT

## 2024-02-21 PROCEDURE — 1036F TOBACCO NON-USER: CPT | Performed by: FAMILY MEDICINE

## 2024-02-21 PROCEDURE — 99214 OFFICE O/P EST MOD 30 MIN: CPT | Performed by: FAMILY MEDICINE

## 2024-02-21 PROCEDURE — 81003 URINALYSIS AUTO W/O SCOPE: CPT | Performed by: FAMILY MEDICINE

## 2024-02-21 PROCEDURE — 99396 PREV VISIT EST AGE 40-64: CPT | Performed by: FAMILY MEDICINE

## 2024-02-21 RX ORDER — NALTREXONE HYDROCHLORIDE 50 MG/1
50 TABLET, FILM COATED ORAL DAILY
Qty: 90 TABLET | Refills: 0 | Status: SHIPPED | OUTPATIENT
Start: 2024-02-21 | End: 2024-05-22 | Stop reason: ALTCHOICE

## 2024-02-21 RX ORDER — FLUOXETINE HYDROCHLORIDE 40 MG/1
40 CAPSULE ORAL DAILY
Qty: 90 CAPSULE | Refills: 1 | Status: SHIPPED | OUTPATIENT
Start: 2024-02-21 | End: 2024-05-22 | Stop reason: SDUPTHER

## 2024-02-21 ASSESSMENT — ENCOUNTER SYMPTOMS
CHILLS: 0
ABDOMINAL PAIN: 0
FREQUENCY: 1
DYSURIA: 1
VOMITING: 0
SHORTNESS OF BREATH: 0
FEVER: 0
NAUSEA: 0
COUGH: 0
DIARRHEA: 0
BACK PAIN: 1

## 2024-02-21 NOTE — PROGRESS NOTES
Subjective   Patient ID: Kate Churchill is a 63 y.o. female who presents for Depression (Recheck ) and Back Pain (Discuss low back pain x1 month).    Kate presents for follow-up. She has been feeling great. Finished detox and rehab from alcohol in December. Taking naltrexone. Has no desire to drink. Has been sober for 60 days.    Depression stable on fluoxetine.     Having urinary frequency and intermittent burning. Symptoms come and go. No blood in urine.     Also haing pain in low back. No injury. Worse when rising to standing position.          Review of Systems   Constitutional:  Negative for chills and fever.   Respiratory:  Negative for cough and shortness of breath.    Cardiovascular:  Negative for chest pain.   Gastrointestinal:  Negative for abdominal pain, diarrhea, nausea and vomiting.   Genitourinary:  Positive for dysuria and frequency.   Musculoskeletal:  Positive for back pain.       Objective   /62   Pulse 85   Wt 60.8 kg (134 lb)   SpO2 99%   BMI 23.74 kg/m²     Physical Exam  Constitutional:       General: She is not in acute distress.     Appearance: Normal appearance.   HENT:      Head: Normocephalic.      Nose: No congestion.      Mouth/Throat:      Mouth: Mucous membranes are moist.   Eyes:      Extraocular Movements: Extraocular movements intact.      Conjunctiva/sclera: Conjunctivae normal.   Cardiovascular:      Rate and Rhythm: Normal rate and regular rhythm.      Heart sounds: No murmur heard.  Pulmonary:      Effort: Pulmonary effort is normal.      Breath sounds: No wheezing or rhonchi.   Abdominal:      Palpations: Abdomen is soft.      Tenderness: There is no abdominal tenderness.   Musculoskeletal:         General: No swelling.      Lumbar back: Tenderness (region of SI joints bilaterally) present. No swelling or deformity. Normal range of motion.   Skin:     General: Skin is warm and dry.   Neurological:      General: No focal deficit present.      Mental Status:  She is alert.   Psychiatric:         Mood and Affect: Mood normal.         Behavior: Behavior normal.         Assessment/Plan   Problem List Items Addressed This Visit             ICD-10-CM    Generalized anxiety disorder (Chronic) F41.1    Relevant Medications    FLUoxetine (PROzac) 40 mg capsule    Moderate recurrent major depression (CMS/HCC) (Chronic) F33.1     Stable. Continue fluoxetine.          Relevant Medications    FLUoxetine (PROzac) 40 mg capsule    Pure hypercholesterolemia (Chronic) E78.00    Relevant Orders    Lipid Panel    Comprehensive Metabolic Panel    Primary insomnia F51.01     Continue trazodone as needed.          Alcohol dependence with uncomplicated intoxication (CMS/HCC) F10.220     Doing well. Plan to continue naltrexone another 3 months.          Relevant Medications    naltrexone (Depade) 50 mg tablet     Other Visit Diagnoses         Codes    Wellness examination    -  Primary Z00.00    Vaccines reviewed. Screening exams up-to-date.     Urinary frequency     R35.0    UA shows trace leuks. Urine sent for culture. Discuss dietary bladder irritants. If culture negative and symptoms persists, plan urology consult.     Relevant Orders    POCT UA Automated manually resulted (Completed)    Urine Culture    Bilateral sacroiliitis (CMS/HCC)     M46.1    Reviewed stretches to improve. Patient declined PT at this time.

## 2024-02-22 LAB — BACTERIA UR CULT: NORMAL

## 2024-05-22 ENCOUNTER — OFFICE VISIT (OUTPATIENT)
Dept: PRIMARY CARE | Facility: CLINIC | Age: 64
End: 2024-05-22
Payer: COMMERCIAL

## 2024-05-22 VITALS
OXYGEN SATURATION: 99 % | SYSTOLIC BLOOD PRESSURE: 108 MMHG | WEIGHT: 136 LBS | DIASTOLIC BLOOD PRESSURE: 72 MMHG | HEART RATE: 89 BPM | BODY MASS INDEX: 24.09 KG/M2 | TEMPERATURE: 97.3 F

## 2024-05-22 DIAGNOSIS — Z13.0 SCREENING FOR DEFICIENCY ANEMIA: ICD-10-CM

## 2024-05-22 DIAGNOSIS — Z13.1 SCREENING FOR DIABETES MELLITUS: ICD-10-CM

## 2024-05-22 DIAGNOSIS — F51.01 PRIMARY INSOMNIA: ICD-10-CM

## 2024-05-22 DIAGNOSIS — F10.21 ALCOHOL DEPENDENCE IN REMISSION (MULTI): ICD-10-CM

## 2024-05-22 DIAGNOSIS — M54.2 NECK PAIN: ICD-10-CM

## 2024-05-22 DIAGNOSIS — F33.1 MODERATE RECURRENT MAJOR DEPRESSION (MULTI): Primary | Chronic | ICD-10-CM

## 2024-05-22 DIAGNOSIS — E78.00 PURE HYPERCHOLESTEROLEMIA: Chronic | ICD-10-CM

## 2024-05-22 DIAGNOSIS — F41.1 GENERALIZED ANXIETY DISORDER: Chronic | ICD-10-CM

## 2024-05-22 PROBLEM — D64.9 ANEMIA: Status: RESOLVED | Noted: 2023-05-15 | Resolved: 2024-05-22

## 2024-05-22 PROCEDURE — 99214 OFFICE O/P EST MOD 30 MIN: CPT | Performed by: FAMILY MEDICINE

## 2024-05-22 PROCEDURE — 1036F TOBACCO NON-USER: CPT | Performed by: FAMILY MEDICINE

## 2024-05-22 RX ORDER — FLUOXETINE HYDROCHLORIDE 40 MG/1
40 CAPSULE ORAL DAILY
Qty: 90 CAPSULE | Refills: 1 | Status: SHIPPED | OUTPATIENT
Start: 2024-05-22

## 2024-05-22 RX ORDER — TRAZODONE HYDROCHLORIDE 50 MG/1
50 TABLET ORAL NIGHTLY PRN
Qty: 30 TABLET | Refills: 5 | Status: SHIPPED | OUTPATIENT
Start: 2024-05-22

## 2024-05-22 RX ORDER — CYCLOBENZAPRINE HCL 5 MG
5 TABLET ORAL NIGHTLY PRN
Qty: 15 TABLET | Refills: 1 | Status: SHIPPED | OUTPATIENT
Start: 2024-05-22 | End: 2024-07-21

## 2024-05-22 ASSESSMENT — ENCOUNTER SYMPTOMS
NECK PAIN: 1
NUMBNESS: 0
COUGH: 0
SHORTNESS OF BREATH: 0
WEAKNESS: 0
ABDOMINAL PAIN: 0
DIARRHEA: 0
NAUSEA: 0
CHILLS: 0
FEVER: 0
VOMITING: 0

## 2024-05-22 NOTE — PROGRESS NOTES
Subjective   Patient ID: Kate Churchill is a 63 y.o. female who presents for Depression (recheck).    Kate has been doing well. Has not drank alcohol since rehab. Feels great. Stopped taking naltrexone a few months ago because she feels like her alcohol cravings are completely resolved. She is following with AA and has a sponsor. Is looking for a counselor. Mood has been stable.     Has been having neck pain on both side of neck. Thinks related to position of head while she is working. Muscles in back of neck feel tight. No numbness or tingling in the extremities.          Review of Systems   Constitutional:  Negative for chills and fever.   Respiratory:  Negative for cough and shortness of breath.    Cardiovascular:  Negative for chest pain.   Gastrointestinal:  Negative for abdominal pain, diarrhea, nausea and vomiting.   Musculoskeletal:  Positive for neck pain.   Neurological:  Negative for weakness and numbness.       Objective   /72   Pulse 89   Temp 36.3 °C (97.3 °F)   Wt 61.7 kg (136 lb)   SpO2 99%   BMI 24.09 kg/m²     Physical Exam  Constitutional:       General: She is not in acute distress.     Appearance: Normal appearance.   HENT:      Head: Normocephalic.      Mouth/Throat:      Mouth: Mucous membranes are moist.   Eyes:      Extraocular Movements: Extraocular movements intact.      Conjunctiva/sclera: Conjunctivae normal.   Cardiovascular:      Rate and Rhythm: Normal rate and regular rhythm.      Heart sounds: No murmur heard.  Pulmonary:      Breath sounds: No wheezing or rhonchi.   Musculoskeletal:      Cervical back: Neck supple. Tenderness (bilateral trapezius) present. No swelling, deformity or bony tenderness.   Skin:     General: Skin is warm and dry.   Neurological:      Mental Status: She is alert.   Psychiatric:         Mood and Affect: Mood normal.         Behavior: Behavior normal.         Assessment/Plan   Problem List Items Addressed This Visit             ICD-10-CM     Generalized anxiety disorder (Chronic) F41.1     Stable. Continue fluoxetine.          Relevant Medications    FLUoxetine (PROzac) 40 mg capsule    Moderate recurrent major depression (Multi) - Primary (Chronic) F33.1     Stable. Continue fluoxetine.          Relevant Medications    FLUoxetine (PROzac) 40 mg capsule    Pure hypercholesterolemia (Chronic) E78.00     Check lipid panel.          Relevant Orders    Lipid Panel    Primary insomnia F51.01     Continue trazodone as needed.          Relevant Medications    traZODone (Desyrel) 50 mg tablet    Alcohol dependence in remission (Multi) F10.21     Patient stopped naltrexone and does not want to restart. Following with sponsor and AA.           Other Visit Diagnoses         Codes    Neck pain     M54.2    Start cyclobenzaprine. Return for OMT if not improving.     Relevant Medications    cyclobenzaprine (Flexeril) 5 mg tablet    Screening for diabetes mellitus     Z13.1    Relevant Orders    Comprehensive Metabolic Panel    Hemoglobin A1C    Screening for deficiency anemia     Z13.0    Relevant Orders    CBC and Auto Differential

## 2024-08-12 ENCOUNTER — TELEPHONE (OUTPATIENT)
Dept: PRIMARY CARE | Facility: CLINIC | Age: 64
End: 2024-08-12
Payer: COMMERCIAL

## 2024-08-12 DIAGNOSIS — M54.2 NECK PAIN: Primary | ICD-10-CM

## 2024-08-12 NOTE — TELEPHONE ENCOUNTER
Pt called Rx line asking for a refill on cyclobenzaprine was last filled in May, last seen on 5/22/24  Next OV 11/22/24  Please advise, KRISHNA Ochoa

## 2024-08-13 RX ORDER — CYCLOBENZAPRINE HCL 5 MG
5 TABLET ORAL NIGHTLY PRN
Qty: 15 TABLET | Refills: 1 | Status: SHIPPED | OUTPATIENT
Start: 2024-08-13

## 2024-10-29 DIAGNOSIS — M54.2 NECK PAIN: ICD-10-CM

## 2024-10-29 RX ORDER — CYCLOBENZAPRINE HCL 5 MG
5 TABLET ORAL NIGHTLY PRN
Qty: 15 TABLET | Refills: 1 | Status: SHIPPED | OUTPATIENT
Start: 2024-10-29

## 2024-11-22 ENCOUNTER — APPOINTMENT (OUTPATIENT)
Dept: PRIMARY CARE | Facility: CLINIC | Age: 64
End: 2024-11-22
Payer: COMMERCIAL

## 2024-11-22 VITALS
OXYGEN SATURATION: 96 % | HEART RATE: 56 BPM | BODY MASS INDEX: 23.38 KG/M2 | TEMPERATURE: 97.4 F | SYSTOLIC BLOOD PRESSURE: 124 MMHG | DIASTOLIC BLOOD PRESSURE: 72 MMHG | WEIGHT: 132 LBS

## 2024-11-22 DIAGNOSIS — F33.1 MODERATE RECURRENT MAJOR DEPRESSION: Chronic | ICD-10-CM

## 2024-11-22 DIAGNOSIS — E78.00 PURE HYPERCHOLESTEROLEMIA: Primary | Chronic | ICD-10-CM

## 2024-11-22 DIAGNOSIS — F17.210 CIGARETTE NICOTINE DEPENDENCE WITHOUT COMPLICATION: ICD-10-CM

## 2024-11-22 DIAGNOSIS — F51.01 PRIMARY INSOMNIA: ICD-10-CM

## 2024-11-22 DIAGNOSIS — M54.2 NECK PAIN: ICD-10-CM

## 2024-11-22 DIAGNOSIS — F41.1 GENERALIZED ANXIETY DISORDER: Chronic | ICD-10-CM

## 2024-11-22 DIAGNOSIS — F10.21 ALCOHOL DEPENDENCE IN REMISSION (MULTI): ICD-10-CM

## 2024-11-22 DIAGNOSIS — Z12.11 SCREEN FOR COLON CANCER: ICD-10-CM

## 2024-11-22 PROCEDURE — 99214 OFFICE O/P EST MOD 30 MIN: CPT | Performed by: FAMILY MEDICINE

## 2024-11-22 PROCEDURE — 1036F TOBACCO NON-USER: CPT | Performed by: FAMILY MEDICINE

## 2024-11-22 RX ORDER — TRAZODONE HYDROCHLORIDE 50 MG/1
50 TABLET ORAL NIGHTLY PRN
Qty: 90 TABLET | Refills: 1 | Status: SHIPPED | OUTPATIENT
Start: 2024-11-22

## 2024-11-22 RX ORDER — CYCLOBENZAPRINE HCL 5 MG
5 TABLET ORAL NIGHTLY PRN
Qty: 15 TABLET | Refills: 1 | Status: SHIPPED | OUTPATIENT
Start: 2024-11-22

## 2024-11-22 RX ORDER — FLUOXETINE HYDROCHLORIDE 40 MG/1
40 CAPSULE ORAL DAILY
Qty: 90 CAPSULE | Refills: 1 | Status: SHIPPED | OUTPATIENT
Start: 2024-11-22

## 2024-11-22 ASSESSMENT — ENCOUNTER SYMPTOMS
CHILLS: 0
FEVER: 0
ABDOMINAL PAIN: 0
NAUSEA: 0
DIARRHEA: 0
COUGH: 0
VOMITING: 0
SHORTNESS OF BREATH: 0
DYSURIA: 0

## 2024-11-22 NOTE — PROGRESS NOTES
Subjective   Patient ID: Kate Churchill is a 64 y.o. female who presents for Anxiety, Depression, and Hyperlipidemia (recheck).    Kate has been feeling well.  She has been sober for 11 months.  She did start smoking again.  She is hoping to quit smoking again in the near future.  Her mood has been stable with current medications.  She is sleeping well.  She is planning to establish with a psychiatrist, but she has been unable to find one yet.         Review of Systems   Constitutional:  Negative for chills and fever.   Respiratory:  Negative for cough and shortness of breath.    Cardiovascular:  Negative for chest pain.   Gastrointestinal:  Negative for abdominal pain, diarrhea, nausea and vomiting.   Genitourinary:  Negative for dysuria.       Objective   /72   Pulse 56   Temp 36.3 °C (97.4 °F)   Wt 59.9 kg (132 lb)   SpO2 96%   BMI 23.38 kg/m²     Physical Exam  Constitutional:       General: She is not in acute distress.     Appearance: Normal appearance.   HENT:      Head: Normocephalic.      Mouth/Throat:      Mouth: Mucous membranes are moist.   Eyes:      Extraocular Movements: Extraocular movements intact.      Conjunctiva/sclera: Conjunctivae normal.   Cardiovascular:      Rate and Rhythm: Normal rate and regular rhythm.      Heart sounds: No murmur heard.  Pulmonary:      Breath sounds: No wheezing or rhonchi.   Musculoskeletal:      Cervical back: Neck supple.   Skin:     General: Skin is warm and dry.   Neurological:      Mental Status: She is alert.   Psychiatric:         Mood and Affect: Mood normal.         Behavior: Behavior normal.         Assessment/Plan   Problem List Items Addressed This Visit             ICD-10-CM    Generalized anxiety disorder (Chronic) F41.1    Relevant Medications    FLUoxetine (PROzac) 40 mg capsule    Moderate recurrent major depression (Chronic) F33.1    Relevant Medications    FLUoxetine (PROzac) 40 mg capsule    Pure hypercholesterolemia - Primary  (Chronic) E78.00     Continue lifestyle changes.  Recheck lipid panel with next labs.         Primary insomnia F51.01    Relevant Medications    traZODone (Desyrel) 50 mg tablet    Alcohol dependence in remission (Multi) F10.21     Congratulated patient on being sober for 11 months.         Cigarette nicotine dependence without complication F17.210     Recommend cessation.  Patient not ready to quit.          Other Visit Diagnoses         Codes    Neck pain     M54.2    Relevant Medications    cyclobenzaprine (Flexeril) 5 mg tablet    Screen for colon cancer     Z12.11    Relevant Orders    Cologuard® colon cancer screening

## 2025-03-11 ENCOUNTER — APPOINTMENT (OUTPATIENT)
Dept: PRIMARY CARE | Facility: CLINIC | Age: 65
End: 2025-03-11

## 2025-03-11 VITALS
DIASTOLIC BLOOD PRESSURE: 78 MMHG | BODY MASS INDEX: 22.32 KG/M2 | HEART RATE: 83 BPM | TEMPERATURE: 97.8 F | OXYGEN SATURATION: 98 % | SYSTOLIC BLOOD PRESSURE: 114 MMHG | WEIGHT: 126 LBS

## 2025-03-11 DIAGNOSIS — F51.01 PRIMARY INSOMNIA: ICD-10-CM

## 2025-03-11 DIAGNOSIS — F41.8 SITUATIONAL ANXIETY: ICD-10-CM

## 2025-03-11 DIAGNOSIS — L98.9 SKIN LESION OF RIGHT LOWER EXTREMITY: Primary | ICD-10-CM

## 2025-03-11 PROCEDURE — 99214 OFFICE O/P EST MOD 30 MIN: CPT | Performed by: FAMILY MEDICINE

## 2025-03-11 RX ORDER — TRAZODONE HYDROCHLORIDE 50 MG/1
50-100 TABLET ORAL NIGHTLY PRN
Qty: 120 TABLET | Refills: 1 | Status: SHIPPED | OUTPATIENT
Start: 2025-03-11

## 2025-03-11 RX ORDER — LORAZEPAM 0.5 MG/1
TABLET ORAL
Qty: 1 TABLET | Refills: 0 | Status: SHIPPED | OUTPATIENT
Start: 2025-03-11

## 2025-03-11 ASSESSMENT — ENCOUNTER SYMPTOMS
FEVER: 0
SLEEP DISTURBANCE: 1
CHILLS: 0
DYSPHORIC MOOD: 0
COUGH: 0
NERVOUS/ANXIOUS: 0

## 2025-03-11 NOTE — PROGRESS NOTES
Subjective   Patient ID: Kate Churchill is a 64 y.o. female who presents for Mass (Discuss lump on R upper thigh x3 months. NKI ) and Insomnia (Recheck ).    Kate has a skin lesion on her outer right thigh.  It has been growing over the last few months.  It is now getting irritated when pulled on by close.  It is red at the base.  She has not had noticed any discharge from the site.  She did try using Compound W on the site, but it has not resolved.    She is also having difficulty sleeping.  Often wakes up in the middle of the night.  Is taking her trazodone nightly.         Review of Systems   Constitutional:  Negative for chills and fever.   HENT:  Negative for congestion.    Respiratory:  Negative for cough.    Skin:         Lesion on thigh   Psychiatric/Behavioral:  Positive for sleep disturbance. Negative for dysphoric mood. The patient is not nervous/anxious.        Objective   /78   Pulse 83   Temp 36.6 °C (97.8 °F)   Wt 57.2 kg (126 lb)   SpO2 98%   BMI 22.32 kg/m²     Physical Exam  Constitutional:       General: She is not in acute distress.     Appearance: Normal appearance.   HENT:      Head: Normocephalic.   Eyes:      Extraocular Movements: Extraocular movements intact.   Pulmonary:      Effort: Pulmonary effort is normal.   Skin:     General: Skin is warm and dry.      Comments: Raised lesion with hyperkeratosis and black discoloration. Mild erythema at base. No drainage.    Neurological:      General: No focal deficit present.      Mental Status: She is alert.   Psychiatric:         Mood and Affect: Mood normal.         Assessment/Plan   Diagnoses and all orders for this visit:  Skin lesion of right lower extremity  Comments:  Suspected wart.  Return for excision.  Orders:  -     Follow Up In Primary Care; Future  Primary insomnia  Comments:  May increase trazodone to 100 mg as needed.  Discussed increasing physical activity to improve sleep quality.  Orders:  -     traZODone  (Desyrel) 50 mg tablet; Take 1-2 tablets ( mg) by mouth as needed at bedtime for sleep.  Situational anxiety  Comments:  Rx for lorazepam x 1 before procedure.  Patient understands that she is not to drive after taking the medication.  Orders:  -     LORazepam (Ativan) 0.5 mg tablet; Take 1 tab PO once before procedure.

## 2025-03-12 ENCOUNTER — OFFICE VISIT (OUTPATIENT)
Dept: PRIMARY CARE | Facility: CLINIC | Age: 65
End: 2025-03-12
Payer: COMMERCIAL

## 2025-03-12 VITALS
SYSTOLIC BLOOD PRESSURE: 130 MMHG | HEART RATE: 86 BPM | TEMPERATURE: 97.6 F | OXYGEN SATURATION: 98 % | DIASTOLIC BLOOD PRESSURE: 84 MMHG

## 2025-03-12 DIAGNOSIS — L98.9 SKIN LESION OF RIGHT LOWER EXTREMITY: ICD-10-CM

## 2025-03-12 PROCEDURE — 1036F TOBACCO NON-USER: CPT | Performed by: FAMILY MEDICINE

## 2025-03-12 PROCEDURE — 11300 SHAVE SKIN LESION 0.5 CM/<: CPT | Performed by: FAMILY MEDICINE

## 2025-03-12 NOTE — PATIENT INSTRUCTIONS
- Please don't shower until tomorrow  - Tomorrow can bathe and let water run over area, but do not scrub  - After bathe, let it air dry then cover with small amount of vaseline and bandaid  - keep covered over the next week, allowing to air out a few hours each day

## 2025-03-12 NOTE — PROGRESS NOTES
SHAVE BIOPSY PROCEDURE NOTE:      Indication: abnormal appearing skin lesion at right upper thigh    Consent: Risks, benefits and alternative treatments discussed with the patient. Patient is informed, understands and would like to proceed with the procedure.     Prep: Skin prep with alcohol swab. 1% xylocaine injected intradermally at site of lesion. Total of 0.5 cc of lidocaine used.     Procedure: Dermablade was used to shave the 0.5 cm lesion tangentially. Hemostasis achieved with electrocautery. The biopsy specimen was sent to the laboratory for pathological evaluation    Post-procedure: The patient was instructed to cleanse the wound twice daily with soap and water, apply vasoline ointment and a bandage for one week's time. Patient was instructed to notify the office if the wound site drains purulent material, becomes painful, red, hard, or swollen.    Location 1: right upper lateral thigh    Suspected wart. Lesion cauterized to prevent recurrence.

## 2025-03-19 LAB
LABORATORY COMMENT REPORT: NORMAL
PATH REPORT.FINAL DX SPEC: NORMAL
PATH REPORT.GROSS SPEC: NORMAL
PATH REPORT.RELEVANT HX SPEC: NORMAL
PATH REPORT.TOTAL CANCER: NORMAL

## 2025-03-20 ENCOUNTER — TELEPHONE (OUTPATIENT)
Dept: PRIMARY CARE | Facility: CLINIC | Age: 65
End: 2025-03-20
Payer: COMMERCIAL

## 2025-03-20 NOTE — TELEPHONE ENCOUNTER
Pathology results discussed with patient. Lesion has elements of wart but concern that atypical cells may represent squamous cell carcinoma. Discussed referral to dermatology for total excision of lesion. Patient states that she does not want to go to dermatology and would prefer re-evaluate lesion in our office visit. Will have her follow-up in office in 3-4 weeks to see if remainder of lesion can be excised in office.     Please call patient to schedule skin lesion recheck in 3-4 weeks.

## 2025-04-15 ENCOUNTER — APPOINTMENT (OUTPATIENT)
Dept: PRIMARY CARE | Facility: CLINIC | Age: 65
End: 2025-04-15
Payer: COMMERCIAL

## 2025-04-16 ENCOUNTER — APPOINTMENT (OUTPATIENT)
Dept: PRIMARY CARE | Facility: CLINIC | Age: 65
End: 2025-04-16
Payer: COMMERCIAL

## 2025-04-16 VITALS
WEIGHT: 124 LBS | TEMPERATURE: 97.6 F | BODY MASS INDEX: 21.97 KG/M2 | SYSTOLIC BLOOD PRESSURE: 120 MMHG | HEART RATE: 91 BPM | DIASTOLIC BLOOD PRESSURE: 70 MMHG | OXYGEN SATURATION: 98 %

## 2025-04-16 DIAGNOSIS — D04.71 SQUAMOUS CELL CARCINOMA IN SITU (SCCIS) OF SKIN OF RIGHT LOWER LEG: Primary | ICD-10-CM

## 2025-04-16 DIAGNOSIS — F33.1 MODERATE RECURRENT MAJOR DEPRESSION: Chronic | ICD-10-CM

## 2025-04-16 DIAGNOSIS — F17.210 CIGARETTE NICOTINE DEPENDENCE WITHOUT COMPLICATION: ICD-10-CM

## 2025-04-16 DIAGNOSIS — F41.8 SITUATIONAL ANXIETY: ICD-10-CM

## 2025-04-16 PROCEDURE — 1036F TOBACCO NON-USER: CPT | Performed by: FAMILY MEDICINE

## 2025-04-16 PROCEDURE — 99406 BEHAV CHNG SMOKING 3-10 MIN: CPT | Performed by: FAMILY MEDICINE

## 2025-04-16 PROCEDURE — 99214 OFFICE O/P EST MOD 30 MIN: CPT | Performed by: FAMILY MEDICINE

## 2025-04-16 RX ORDER — BUPROPION HYDROCHLORIDE 150 MG/1
150 TABLET ORAL EVERY MORNING
Qty: 90 TABLET | Refills: 0 | Status: SHIPPED | OUTPATIENT
Start: 2025-04-16

## 2025-04-16 RX ORDER — LORAZEPAM 0.5 MG/1
TABLET ORAL
Qty: 1 TABLET | Refills: 0 | Status: SHIPPED | OUTPATIENT
Start: 2025-04-16

## 2025-04-16 ASSESSMENT — ENCOUNTER SYMPTOMS
CHILLS: 0
SHORTNESS OF BREATH: 0
SLEEP DISTURBANCE: 0
FEVER: 0
DYSPHORIC MOOD: 1
COUGH: 0
NERVOUS/ANXIOUS: 0

## 2025-04-16 ASSESSMENT — PATIENT HEALTH QUESTIONNAIRE - PHQ9
SUM OF ALL RESPONSES TO PHQ9 QUESTIONS 1 AND 2: 6
9. THOUGHTS THAT YOU WOULD BE BETTER OFF DEAD, OR OF HURTING YOURSELF: NOT AT ALL
6. FEELING BAD ABOUT YOURSELF - OR THAT YOU ARE A FAILURE OR HAVE LET YOURSELF OR YOUR FAMILY DOWN: SEVERAL DAYS
3. TROUBLE FALLING OR STAYING ASLEEP OR SLEEPING TOO MUCH: SEVERAL DAYS
2. FEELING DOWN, DEPRESSED OR HOPELESS: NEARLY EVERY DAY
7. TROUBLE CONCENTRATING ON THINGS, SUCH AS READING THE NEWSPAPER OR WATCHING TELEVISION: SEVERAL DAYS
SUM OF ALL RESPONSES TO PHQ QUESTIONS 1-9: 14
5. POOR APPETITE OR OVEREATING: NEARLY EVERY DAY
1. LITTLE INTEREST OR PLEASURE IN DOING THINGS: NEARLY EVERY DAY
8. MOVING OR SPEAKING SO SLOWLY THAT OTHER PEOPLE COULD HAVE NOTICED. OR THE OPPOSITE, BEING SO FIGETY OR RESTLESS THAT YOU HAVE BEEN MOVING AROUND A LOT MORE THAN USUAL: SEVERAL DAYS
4. FEELING TIRED OR HAVING LITTLE ENERGY: SEVERAL DAYS
10. IF YOU CHECKED OFF ANY PROBLEMS, HOW DIFFICULT HAVE THESE PROBLEMS MADE IT FOR YOU TO DO YOUR WORK, TAKE CARE OF THINGS AT HOME, OR GET ALONG WITH OTHER PEOPLE: SOMEWHAT DIFFICULT

## 2025-04-16 NOTE — ASSESSMENT & PLAN NOTE
Pathology from skin shave concerning for well-differentiated squamous cell carcinoma. Discussed with patient that very important that she have follow-up with dermatology for full excision of lesion. Urgent referral placed.

## 2025-04-16 NOTE — PROGRESS NOTES
Subjective   Patient ID: Kate Churchill is a 64 y.o. female who presents for Suspicious Skin Lesion (Recheck skin lesion on R upper thigh after removal ).    Kate has been feeling down. 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 She is feeling very stressed at her job. Has been more depressed than usual. Sleeping more. Is taking fluoxetine. Her anxiety is stable.     She also is ready to quit smoking. Lately has been so stressed that she has been chain smoking. Wants to quit. Did have success with Wellbutrin in past.          Review of Systems   Constitutional:  Negative for chills and fever.   HENT:  Negative for congestion.    Respiratory:  Negative for cough and shortness of breath.    Cardiovascular:  Negative for chest pain.   Psychiatric/Behavioral:  Positive for dysphoric mood. Negative for sleep disturbance. The patient is not nervous/anxious.        Objective   /70   Pulse 91   Temp 36.4 °C (97.6 °F)   Wt 56.2 kg (124 lb)   SpO2 98%   BMI 21.97 kg/m²     Physical Exam  Constitutional:       General: She is not in acute distress.     Appearance: Normal appearance.   HENT:      Head: Normocephalic.   Pulmonary:      Effort: Pulmonary effort is normal.   Musculoskeletal:      Cervical back: Neck supple.   Neurological:      General: No focal deficit present.      Mental Status: She is alert.   Psychiatric:         Attention and Perception: Attention normal.         Mood and Affect: Affect is tearful.         Speech: Speech normal.         Thought Content: Thought content normal.         Assessment/Plan   Problem List Items Addressed This Visit           ICD-10-CM    Moderate recurrent major depression (Chronic) F33.1    Continue fluoxetine. Start Wellbutrin.          Relevant Medications    buPROPion XL (Wellbutrin XL) 150 mg 24 hr tablet    Cigarette nicotine dependence without complication F17.210    Spent >3 min but <10 min counseling on smoking cessation.  Patient ready to quit. Start Wellbutrin.          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 Discussed with patient that very important that she have follow-up with dermatology for full excision of lesion. Urgent referral placed.           Relevant Orders    Referral to Dermatology     Other Visit Diagnoses         Codes      Situational anxiety     F41.8    Rx for lorazepam x 1 before procedure.  Patient understands that she is not to drive after taking the medication.     Relevant Medications    LORazepam (Ativan) 0.5 mg tablet

## 2025-04-16 NOTE — ASSESSMENT & PLAN NOTE
Spent >3 min but <10 min counseling on smoking cessation. Patient ready to quit. Start Wellbutrin.

## 2025-04-19 ENCOUNTER — OFFICE VISIT (OUTPATIENT)
Dept: DERMATOLOGY | Facility: CLINIC | Age: 65
End: 2025-04-19
Payer: COMMERCIAL

## 2025-04-19 DIAGNOSIS — D09.9 SQUAMOUS CELL CARCINOMA IN SITU (SCCIS): Primary | ICD-10-CM

## 2025-04-25 ENCOUNTER — TELEPHONE (OUTPATIENT)
Dept: PRIMARY CARE | Facility: CLINIC | Age: 65
End: 2025-04-25
Payer: COMMERCIAL

## 2025-04-25 NOTE — TELEPHONE ENCOUNTER
Pt called rx line @ 8:40am regarding her Wellbutrin. She started this medication on 4/16/25. Since taking she has been very nervous, increased anxiety. Asking if she can try every other day before stopping? Please advise. Thanks. JW

## 2025-04-25 NOTE — TELEPHONE ENCOUNTER
Sorry to hear that the medication is making her feel more anxious. She can stop taking the medication. Let's give it a week or so off medication to see how she feels then we can consider other medication if the depressions symptoms are still not improved.

## 2025-04-30 ENCOUNTER — TELEPHONE (OUTPATIENT)
Dept: DERMATOLOGY | Facility: CLINIC | Age: 65
End: 2025-04-30
Payer: COMMERCIAL

## 2025-04-30 NOTE — TELEPHONE ENCOUNTER
"Called patient ahead of her procedure with Dr. Manzanares next week, I let her know that there is no photo of the site we are treating in the chart, so we were wondering if she would be able to point it out to us when we see her in person. She said \"yes absolutely\". Patient did not voice any questions or concerns at this time.   "

## 2025-05-09 ENCOUNTER — APPOINTMENT (OUTPATIENT)
Dept: DERMATOLOGY | Facility: CLINIC | Age: 65
End: 2025-05-09
Payer: COMMERCIAL

## 2025-05-09 VITALS — HEART RATE: 72 BPM | SYSTOLIC BLOOD PRESSURE: 132 MMHG | DIASTOLIC BLOOD PRESSURE: 83 MMHG

## 2025-05-09 DIAGNOSIS — C44.722 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, INCLUDING HIP: ICD-10-CM

## 2025-05-09 PROCEDURE — 17313 MOHS 1 STAGE T/A/L: CPT | Performed by: STUDENT IN AN ORGANIZED HEALTH CARE EDUCATION/TRAINING PROGRAM

## 2025-05-09 NOTE — PROGRESS NOTES
Office Visit Note  Date: 5/9/2025  Surgeon:  Renan Manzanares MD  Office Location:  [id number] [address]  Dept: [phone]  Dept Fax: [phone]  Referring Provider: Susan L Mayne, PAMELLA-LEI  [address] [place] [address]    Subjective   Kate Churchill is a 64 y.o. female who presents for the following: MOHS Surgery (Right Upper Thigh)    According to the patient, the lesion has been present for approximately greater than 1 year at the time of diagnosis.  The lesion is itchy.  The lesion has not been treated previously.    The patient does not have a pacemaker / defibrillator.  The patient does not have a heart valve / joint replacement.    The patient is not on blood thinners.  The patient does not have a history of hepatitis B or C.  The patient does not have a history of HIV.  The patient does not have a history of immunosuppression (e.g. organ transplantation, malignancy, medications)    Review of Systems:  No other skin or systemic complaints other than what is documented elsewhere in the note.    MEDICAL HISTORY: clinically relevant history including significant past medical history, medications and allergies was reviewed and documented in Epic.    Objective   Well appearing patient in no apparent distress; mood and affect are within normal limits.  Vital signs: See record.  Noted on the   Right Upper Thigh  Is a 1 x 1 cm scar    The patient confirmed the identified site.    Discussion:  The nature of the diagnosis was explained. The lesion is a skin cancer.  It has a risk of local growth and distant spread. The condition is associated with sun exposure.  Warning signs of non-melanoma skin cancer discussed. Patient was instructed to perform monthly self skin examination.  We recommended that the patient have regular full skin exams given an increased risk of subsequent skin cancers. The patient was instructed to use sun  protective behaviors including use of broad spectrum sunscreens and sun protective clothing to reduce risk of skin cancers.      Risks, benefits, side effects of Mohs surgery were discussed with patient and the patient voiced understanding.  It was explained that even though the cure rate of Mohs is very high it is not 100%. Risks of surgery including but not limited to bleeding, infection, numbness, nerve damage, and scar were reviewed.  Discussion included wound care requirements, activity restrictions, likely scar outcome and time to heal.     After Mohs surgery, the defect may need to be repaired surgically and the scar may be longer than the original lesion.  Reconstruction options, risks, and benefits were reviewed including second intention healing, linear repair (4-1 ratio was explained), local flaps, skin grafts, cartilage grafts and interpolation flaps (the need for multiple surgeries was explained). Possible outcomes were reviewed including likely scar appearance, failure of flap survival, infection, bleeding and the need for revision surgery.     The pathology was reviewed, the photograph was reviewed, and the referring physician's note was reviewed.    Patient elected for Mohs surgery.     ISherly RN  am scribing for, and in the presence of MD LAURA Shanks Jake X Wang, MD, personally performed the services described in the documentation as scribed by Sherly Du RN  in my presence, and confirm it is both accurate and complete.

## 2025-05-09 NOTE — PROGRESS NOTES
Mohs Surgery Operative Note    Date of Surgery:  5/9/2025  Surgeon:  Renan Manzanares MD  Office Location:  [address] [facility]  [address] [address]  Dept: [phone]  Dept Fax: [phone]  Referring Provider: Susan L Mayne, PAMELLA-LEI  [address] [address]      Assessment/Plan   Pre-procedure:   Obtained informed consent: written from patient  The surgical site was identified and confirmed with the patient.     Intra-operative:   Audible time out called at : 09:20 AM 05/09/25  by: OMAR HOFFMAN RN   Verified patient name, birthdate, site, specimen bottle label & requisition.    The planned procedure(s) was again reviewed with the patient. The risks of bleeding, infection, nerve damage and scarring were reviewed. Written authorization was obtained. The patient identity, surgical site, and planned procedure(s) were verified. The provider acted as both surgeon and pathologist.     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, INCLUDING HIP  Right Upper Thigh  Mohs surgery    Consent obtained: written    Universal Protocol:  Procedure explained and questions answered to patient or proxy's satisfaction: Yes    Test results available and properly labeled: Yes    Pathology report reviewed: Yes    External notes reviewed: Yes    Photo or diagram used for site identification: Yes    Site/side marked: Yes    Slide independently reviewed by Mohs surgeon: Yes    Immediately prior to procedure a time out was called: Yes    Patient identity confirmed: verbally with patient  Preparation: Patient was prepped and draped in usual sterile fashion      Anticoagulation:  Is the patient taking prescription anticoagulant and/or aspirin prescribed/recommended by a physician? No    Was the anticoagulation regimen changed prior to Mohs? No      Anesthesia:  Anesthesia method: local infiltration  Local anesthetic: lidocaine 1% WITH epi    Procedure  Details:  Case ID Number: [id number]  Biopsy accession number: [id number]  Date of biopsy: 3/12/2025  Pre-Op diagnosis: squamous cell carcinoma  SCC subtype: Well Differentiated.  Surgical site (from skin exam): Right Upper Thigh  Pre-operative length (cm): 1  Pre-operative width (cm): 1  Indications for Mohs surgery: ill-defined borders  Previously treated? No      Micrographic Surgery Details:  Post-operative length (cm): 1.1  Post-operative width (cm): 1  Number of Mohs stages: 1    Stage 1     Comments: The patient was brought into the operating room and placed in the procedure chair in the appropriate position.  The area positive by previous biopsy was identified and confirmed with the patient. The area of clinically obvious tumor was debulked using a curette and/or scalpel as needed. An incision was made following the Mohs approach through the skin. The specimen was taken to the lab, divided into 2 piece(s) and appropriately chromacoded and processed.       Tumor features identified on Mohs section: no tumor identified    Depth of defect: subcutaneous fat    Patient tolerance of procedure: tolerated well, no immediate complications    Reconstruction:  Was the defect reconstructed?: No     Repair: After a discussion with the patient regarding the options for wound closure, a decision was made to proceed with second intention healing.    Dressing/Follow-up: Surgifoam was placed in the wound. A pressure dressing was placed to help stabilize the wound and to minimize the risk of postoperative bleeding. Wound care was discussed, and the patient was given written post-operative wound care instructions.          Staff Communication: Dermatology Local Anesthesia: Site Location: Right Upper Thigh 1 % Lidocaine / Epinephrine - Amount: 3cc          The patient will follow up with Renan Manzanares MD as needed for any post operative problems or concerns, and will follow up with their primary dermatologist as scheduled.       I,  Sherly Du RN  am scribing for, and in the presence of Renan Manzanares MD    I, Renan Manzanares MD, personally performed the services described in the documentation as scribed by Sherly Du RN  in my presence, and confirm it is both accurate and complete.

## 2025-06-04 ENCOUNTER — APPOINTMENT (OUTPATIENT)
Dept: PRIMARY CARE | Facility: CLINIC | Age: 65
End: 2025-06-04
Payer: COMMERCIAL

## 2025-06-09 DIAGNOSIS — M54.2 NECK PAIN: ICD-10-CM

## 2025-06-09 NOTE — TELEPHONE ENCOUNTER
Patient called rx line at 1126 requesting refill on pended med?   OK for refill or does pt need OV? Thanks, CG

## 2025-06-10 RX ORDER — CYCLOBENZAPRINE HCL 5 MG
5 TABLET ORAL NIGHTLY PRN
Qty: 30 TABLET | Refills: 1 | Status: SHIPPED | OUTPATIENT
Start: 2025-06-10

## 2025-07-01 DIAGNOSIS — F41.1 GENERALIZED ANXIETY DISORDER: Chronic | ICD-10-CM

## 2025-07-01 DIAGNOSIS — F33.1 MODERATE RECURRENT MAJOR DEPRESSION: Chronic | ICD-10-CM

## 2025-07-01 RX ORDER — FLUOXETINE HYDROCHLORIDE 40 MG/1
40 CAPSULE ORAL DAILY
Qty: 90 CAPSULE | Refills: 0 | Status: SHIPPED | OUTPATIENT
Start: 2025-07-01

## 2025-07-18 ENCOUNTER — APPOINTMENT (OUTPATIENT)
Dept: PRIMARY CARE | Facility: CLINIC | Age: 65
End: 2025-07-18
Payer: COMMERCIAL

## 2025-07-18 DIAGNOSIS — F51.01 PRIMARY INSOMNIA: ICD-10-CM

## 2025-07-18 RX ORDER — TRAZODONE HYDROCHLORIDE 50 MG/1
50-100 TABLET ORAL NIGHTLY PRN
Qty: 120 TABLET | Refills: 1 | Status: SHIPPED | OUTPATIENT
Start: 2025-07-18

## 2025-08-14 ENCOUNTER — APPOINTMENT (OUTPATIENT)
Dept: PRIMARY CARE | Facility: CLINIC | Age: 65
End: 2025-08-14
Payer: COMMERCIAL

## 2025-09-22 ENCOUNTER — APPOINTMENT (OUTPATIENT)
Dept: PRIMARY CARE | Facility: CLINIC | Age: 65
End: 2025-09-22
Payer: COMMERCIAL

## 2025-09-26 ENCOUNTER — APPOINTMENT (OUTPATIENT)
Dept: PRIMARY CARE | Facility: CLINIC | Age: 65
End: 2025-09-26
Payer: COMMERCIAL

## 2025-11-12 ENCOUNTER — APPOINTMENT (OUTPATIENT)
Dept: DERMATOLOGY | Facility: CLINIC | Age: 65
End: 2025-11-12
Payer: COMMERCIAL